# Patient Record
Sex: FEMALE | Race: WHITE | URBAN - METROPOLITAN AREA
[De-identification: names, ages, dates, MRNs, and addresses within clinical notes are randomized per-mention and may not be internally consistent; named-entity substitution may affect disease eponyms.]

---

## 2017-06-07 LAB
HCT VFR BLD CALC: 33.7 % (ref 36–48)
HEMOGLOBIN: 10.9 G/DL (ref 12–16)
MCH RBC QN AUTO: 29.7 PG (ref 26–34)
MCHC RBC AUTO-ENTMCNC: 32.5 G/DL (ref 31–36)
MCV RBC AUTO: 91.5 FL (ref 80–100)
PDW BLD-RTO: 13.7 % (ref 12.4–15.4)
PLATELET # BLD: 210 K/UL (ref 135–450)
PMV BLD AUTO: 9.9 FL (ref 5–10.5)
RBC # BLD: 3.68 M/UL (ref 4–5.2)
WBC # BLD: 11.3 K/UL (ref 4–11)

## 2017-06-08 LAB — GLUCOSE CHALLENGE: 91 MG/DL

## 2017-09-25 PROBLEM — Z37.9 NORMAL LABOR: Status: ACTIVE | Noted: 2017-09-25

## 2018-04-30 LAB
ABO/RH: NORMAL
ANTIBODY SCREEN: NORMAL
HEPATITIS C ANTIBODY INTERPRETATION: NORMAL

## 2018-05-01 LAB
BASOPHILS ABSOLUTE: 0 K/UL (ref 0–0.2)
BASOPHILS RELATIVE PERCENT: 0.3 %
EOSINOPHILS ABSOLUTE: 0.3 K/UL (ref 0–0.6)
EOSINOPHILS RELATIVE PERCENT: 3.2 %
HCT VFR BLD CALC: 36.6 % (ref 36–48)
HEMOGLOBIN: 12.2 G/DL (ref 12–16)
HEPATITIS B SURFACE ANTIGEN INTERPRETATION: ABNORMAL
LYMPHOCYTES ABSOLUTE: 1.6 K/UL (ref 1–5.1)
LYMPHOCYTES RELATIVE PERCENT: 20.2 %
MCH RBC QN AUTO: 27.6 PG (ref 26–34)
MCHC RBC AUTO-ENTMCNC: 33.4 G/DL (ref 31–36)
MCV RBC AUTO: 82.7 FL (ref 80–100)
MONOCYTES ABSOLUTE: 0.5 K/UL (ref 0–1.3)
MONOCYTES RELATIVE PERCENT: 6.4 %
NEUTROPHILS ABSOLUTE: 5.5 K/UL (ref 1.7–7.7)
NEUTROPHILS RELATIVE PERCENT: 69.9 %
PDW BLD-RTO: 16.4 % (ref 12.4–15.4)
PLATELET # BLD: 180 K/UL (ref 135–450)
PMV BLD AUTO: 10.5 FL (ref 5–10.5)
RBC # BLD: 4.43 M/UL (ref 4–5.2)
RPR: ABNORMAL
RUBELLA ANTIBODY IGG: 147.2 IU/ML
WBC # BLD: 7.9 K/UL (ref 4–11)

## 2018-05-02 LAB
HIV AG/AB: NORMAL
HIV ANTIGEN: NORMAL
HIV-1 ANTIBODY: NORMAL
HIV-2 AB: NORMAL

## 2018-06-29 LAB
GLUCOSE CHALLENGE: 123 MG/DL
HCT VFR BLD CALC: 31.6 % (ref 36–48)
HEMOGLOBIN: 10.7 G/DL (ref 12–16)
MCH RBC QN AUTO: 29.2 PG (ref 26–34)
MCHC RBC AUTO-ENTMCNC: 34 G/DL (ref 31–36)
MCV RBC AUTO: 85.8 FL (ref 80–100)
PDW BLD-RTO: 14.6 % (ref 12.4–15.4)
PLATELET # BLD: 187 K/UL (ref 135–450)
PMV BLD AUTO: 10.2 FL (ref 5–10.5)
RBC # BLD: 3.68 M/UL (ref 4–5.2)
WBC # BLD: 11.3 K/UL (ref 4–11)

## 2018-09-17 LAB
GROUP B STREP CULTURE: NEGATIVE
GROUP B STREP CULTURE: NEGATIVE

## 2018-10-07 ENCOUNTER — HOSPITAL ENCOUNTER (INPATIENT)
Age: 23
LOS: 2 days | Discharge: HOME | DRG: 560 | End: 2018-10-09
Attending: OBSTETRICS & GYNECOLOGY | Admitting: OBSTETRICS & GYNECOLOGY
Payer: COMMERCIAL

## 2018-10-07 ENCOUNTER — ANESTHESIA (OUTPATIENT)
Dept: LABOR AND DELIVERY | Age: 23
DRG: 560 | End: 2018-10-07
Payer: COMMERCIAL

## 2018-10-07 ENCOUNTER — ANESTHESIA EVENT (OUTPATIENT)
Dept: LABOR AND DELIVERY | Age: 23
DRG: 560 | End: 2018-10-07
Payer: COMMERCIAL

## 2018-10-07 LAB
AMPHETAMINE SCREEN, URINE: NORMAL
BARBITURATE SCREEN URINE: NORMAL
BENZODIAZEPINE SCREEN, URINE: NORMAL
BUPRENORPHINE URINE: NORMAL
CANNABINOID SCREEN URINE: NORMAL
COCAINE METABOLITE SCREEN URINE: NORMAL
HCT VFR BLD CALC: 30.7 % (ref 36–48)
HEMOGLOBIN: 10.4 G/DL (ref 12–16)
Lab: NORMAL
MCH RBC QN AUTO: 26.4 PG (ref 26–34)
MCHC RBC AUTO-ENTMCNC: 34 G/DL (ref 31–36)
MCV RBC AUTO: 77.5 FL (ref 80–100)
METHADONE SCREEN, URINE: NORMAL
OPIATE SCREEN URINE: NORMAL
OXYCODONE URINE: NORMAL
PDW BLD-RTO: 14.8 % (ref 12.4–15.4)
PH UA: 5
PHENCYCLIDINE SCREEN URINE: NORMAL
PLATELET # BLD: 185 K/UL (ref 135–450)
PMV BLD AUTO: 9.3 FL (ref 5–10.5)
PROPOXYPHENE SCREEN: NORMAL
RBC # BLD: 3.96 M/UL (ref 4–5.2)
SPECIMEN STATUS: NORMAL
WBC # BLD: 8.4 K/UL (ref 4–11)

## 2018-10-07 PROCEDURE — 86780 TREPONEMA PALLIDUM: CPT

## 2018-10-07 PROCEDURE — 6370000000 HC RX 637 (ALT 250 FOR IP): Performed by: OBSTETRICS & GYNECOLOGY

## 2018-10-07 PROCEDURE — 1220000000 HC SEMI PRIVATE OB R&B

## 2018-10-07 PROCEDURE — 0HQ9XZZ REPAIR PERINEUM SKIN, EXTERNAL APPROACH: ICD-10-PCS | Performed by: OBSTETRICS & GYNECOLOGY

## 2018-10-07 PROCEDURE — 6360000002 HC RX W HCPCS: Performed by: OBSTETRICS & GYNECOLOGY

## 2018-10-07 PROCEDURE — 3E033VJ INTRODUCTION OF OTHER HORMONE INTO PERIPHERAL VEIN, PERCUTANEOUS APPROACH: ICD-10-PCS | Performed by: OBSTETRICS & GYNECOLOGY

## 2018-10-07 PROCEDURE — 3700000025 ANESTHESIA EPIDURAL BLOCK: Performed by: ANESTHESIOLOGY

## 2018-10-07 PROCEDURE — 10907ZC DRAINAGE OF AMNIOTIC FLUID, THERAPEUTIC FROM PRODUCTS OF CONCEPTION, VIA NATURAL OR ARTIFICIAL OPENING: ICD-10-PCS | Performed by: OBSTETRICS & GYNECOLOGY

## 2018-10-07 PROCEDURE — 2580000003 HC RX 258: Performed by: OBSTETRICS & GYNECOLOGY

## 2018-10-07 PROCEDURE — 7200000001 HC VAGINAL DELIVERY

## 2018-10-07 PROCEDURE — 2500000003 HC RX 250 WO HCPCS: Performed by: NURSE ANESTHETIST, CERTIFIED REGISTERED

## 2018-10-07 PROCEDURE — 80307 DRUG TEST PRSMV CHEM ANLYZR: CPT

## 2018-10-07 PROCEDURE — 51701 INSERT BLADDER CATHETER: CPT

## 2018-10-07 PROCEDURE — 85027 COMPLETE CBC AUTOMATED: CPT

## 2018-10-07 RX ORDER — LANOLIN 100 %
OINTMENT (GRAM) TOPICAL PRN
Status: DISCONTINUED | OUTPATIENT
Start: 2018-10-07 | End: 2018-10-09 | Stop reason: HOSPADM

## 2018-10-07 RX ORDER — ACETAMINOPHEN 325 MG/1
650 TABLET ORAL EVERY 4 HOURS PRN
Status: DISCONTINUED | OUTPATIENT
Start: 2018-10-07 | End: 2018-10-09 | Stop reason: HOSPADM

## 2018-10-07 RX ORDER — IBUPROFEN 600 MG/1
600 TABLET ORAL EVERY 6 HOURS SCHEDULED
Status: DISCONTINUED | OUTPATIENT
Start: 2018-10-07 | End: 2018-10-07

## 2018-10-07 RX ORDER — DOCUSATE SODIUM 100 MG/1
100 CAPSULE, LIQUID FILLED ORAL 2 TIMES DAILY
Status: DISCONTINUED | OUTPATIENT
Start: 2018-10-07 | End: 2018-10-07

## 2018-10-07 RX ORDER — SODIUM CHLORIDE 0.9 % (FLUSH) 0.9 %
10 SYRINGE (ML) INJECTION EVERY 12 HOURS SCHEDULED
Status: DISCONTINUED | OUTPATIENT
Start: 2018-10-07 | End: 2018-10-07

## 2018-10-07 RX ORDER — IBUPROFEN 800 MG/1
800 TABLET ORAL EVERY 6 HOURS PRN
Qty: 40 TABLET | Refills: 0 | Status: SHIPPED | OUTPATIENT
Start: 2018-10-07

## 2018-10-07 RX ORDER — ONDANSETRON 2 MG/ML
4 INJECTION INTRAMUSCULAR; INTRAVENOUS EVERY 6 HOURS PRN
Status: DISCONTINUED | OUTPATIENT
Start: 2018-10-07 | End: 2018-10-09 | Stop reason: HOSPADM

## 2018-10-07 RX ORDER — SODIUM CHLORIDE 0.9 % (FLUSH) 0.9 %
10 SYRINGE (ML) INJECTION PRN
Status: DISCONTINUED | OUTPATIENT
Start: 2018-10-07 | End: 2018-10-07

## 2018-10-07 RX ORDER — IBUPROFEN 800 MG/1
800 TABLET ORAL EVERY 6 HOURS SCHEDULED
Status: DISCONTINUED | OUTPATIENT
Start: 2018-10-08 | End: 2018-10-08

## 2018-10-07 RX ORDER — DOCUSATE SODIUM 100 MG/1
100 CAPSULE, LIQUID FILLED ORAL 2 TIMES DAILY
Status: DISCONTINUED | OUTPATIENT
Start: 2018-10-07 | End: 2018-10-09 | Stop reason: HOSPADM

## 2018-10-07 RX ORDER — FAMOTIDINE 20 MG/1
20 TABLET, FILM COATED ORAL 2 TIMES DAILY PRN
Status: DISCONTINUED | OUTPATIENT
Start: 2018-10-07 | End: 2018-10-09 | Stop reason: HOSPADM

## 2018-10-07 RX ORDER — LIDOCAINE HYDROCHLORIDE 20 MG/ML
INJECTION, SOLUTION INFILTRATION; PERINEURAL PRN
Status: DISCONTINUED | OUTPATIENT
Start: 2018-10-07 | End: 2018-10-07 | Stop reason: SDUPTHER

## 2018-10-07 RX ORDER — SODIUM CHLORIDE 0.9 % (FLUSH) 0.9 %
10 SYRINGE (ML) INJECTION PRN
Status: DISCONTINUED | OUTPATIENT
Start: 2018-10-07 | End: 2018-10-09 | Stop reason: HOSPADM

## 2018-10-07 RX ORDER — SODIUM CHLORIDE 0.9 % (FLUSH) 0.9 %
10 SYRINGE (ML) INJECTION EVERY 12 HOURS SCHEDULED
Status: DISCONTINUED | OUTPATIENT
Start: 2018-10-07 | End: 2018-10-09 | Stop reason: HOSPADM

## 2018-10-07 RX ORDER — LIDOCAINE HYDROCHLORIDE AND EPINEPHRINE BITARTRATE 20; .01 MG/ML; MG/ML
INJECTION, SOLUTION SUBCUTANEOUS PRN
Status: DISCONTINUED | OUTPATIENT
Start: 2018-10-07 | End: 2018-10-07 | Stop reason: SDUPTHER

## 2018-10-07 RX ORDER — METHYLERGONOVINE MALEATE 0.2 MG/ML
200 INJECTION INTRAVENOUS
Status: ACTIVE | OUTPATIENT
Start: 2018-10-07 | End: 2018-10-07

## 2018-10-07 RX ORDER — OXYCODONE HYDROCHLORIDE 5 MG/1
5 TABLET ORAL EVERY 6 HOURS PRN
Qty: 12 TABLET | Refills: 0 | Status: SHIPPED | OUTPATIENT
Start: 2018-10-07 | End: 2018-10-10

## 2018-10-07 RX ORDER — ONDANSETRON 2 MG/ML
4 INJECTION INTRAMUSCULAR; INTRAVENOUS EVERY 6 HOURS PRN
Status: DISCONTINUED | OUTPATIENT
Start: 2018-10-07 | End: 2018-10-07

## 2018-10-07 RX ORDER — OXYCODONE HYDROCHLORIDE AND ACETAMINOPHEN 5; 325 MG/1; MG/1
2 TABLET ORAL EVERY 4 HOURS PRN
Status: DISCONTINUED | OUTPATIENT
Start: 2018-10-07 | End: 2018-10-09 | Stop reason: HOSPADM

## 2018-10-07 RX ORDER — ACETAMINOPHEN 500 MG
1000 TABLET ORAL EVERY 6 HOURS PRN
Qty: 30 TABLET | Refills: 0 | Status: SHIPPED | OUTPATIENT
Start: 2018-10-07 | End: 2022-04-19

## 2018-10-07 RX ORDER — SODIUM CHLORIDE, SODIUM LACTATE, POTASSIUM CHLORIDE, CALCIUM CHLORIDE 600; 310; 30; 20 MG/100ML; MG/100ML; MG/100ML; MG/100ML
INJECTION, SOLUTION INTRAVENOUS CONTINUOUS
Status: DISCONTINUED | OUTPATIENT
Start: 2018-10-07 | End: 2018-10-07

## 2018-10-07 RX ORDER — FERROUS SULFATE 325(65) MG
325 TABLET ORAL DAILY
Status: DISCONTINUED | OUTPATIENT
Start: 2018-10-07 | End: 2018-10-09 | Stop reason: HOSPADM

## 2018-10-07 RX ORDER — CARBOPROST TROMETHAMINE 250 UG/ML
250 INJECTION, SOLUTION INTRAMUSCULAR
Status: ACTIVE | OUTPATIENT
Start: 2018-10-07 | End: 2018-10-07

## 2018-10-07 RX ORDER — OXYCODONE HYDROCHLORIDE AND ACETAMINOPHEN 5; 325 MG/1; MG/1
1 TABLET ORAL EVERY 4 HOURS PRN
Status: DISCONTINUED | OUTPATIENT
Start: 2018-10-07 | End: 2018-10-09 | Stop reason: HOSPADM

## 2018-10-07 RX ORDER — ONDANSETRON 4 MG/1
4 TABLET, ORALLY DISINTEGRATING ORAL EVERY 6 HOURS PRN
Status: DISCONTINUED | OUTPATIENT
Start: 2018-10-07 | End: 2018-10-09 | Stop reason: HOSPADM

## 2018-10-07 RX ORDER — SENNA AND DOCUSATE SODIUM 50; 8.6 MG/1; MG/1
1 TABLET, FILM COATED ORAL DAILY PRN
Status: DISCONTINUED | OUTPATIENT
Start: 2018-10-07 | End: 2018-10-09 | Stop reason: HOSPADM

## 2018-10-07 RX ORDER — LIDOCAINE HYDROCHLORIDE 10 MG/ML
INJECTION, SOLUTION INFILTRATION; PERINEURAL PRN
Status: DISCONTINUED | OUTPATIENT
Start: 2018-10-07 | End: 2018-10-07 | Stop reason: SDUPTHER

## 2018-10-07 RX ORDER — SIMETHICONE 80 MG
80 TABLET,CHEWABLE ORAL EVERY 6 HOURS PRN
Status: DISCONTINUED | OUTPATIENT
Start: 2018-10-07 | End: 2018-10-09 | Stop reason: HOSPADM

## 2018-10-07 RX ORDER — MISOPROSTOL 100 UG/1
800 TABLET ORAL PRN
Status: DISCONTINUED | OUTPATIENT
Start: 2018-10-07 | End: 2018-10-09 | Stop reason: HOSPADM

## 2018-10-07 RX ADMIN — Medication 9 MILLI-UNITS/MIN: at 14:42

## 2018-10-07 RX ADMIN — LIDOCAINE HYDROCHLORIDE 10 ML: 20 INJECTION, SOLUTION INFILTRATION; PERINEURAL at 17:49

## 2018-10-07 RX ADMIN — Medication 1 MILLI-UNITS/MIN: at 11:24

## 2018-10-07 RX ADMIN — Medication 12 ML/HR: at 14:58

## 2018-10-07 RX ADMIN — DOCUSATE SODIUM 100 MG: 100 CAPSULE, LIQUID FILLED ORAL at 22:52

## 2018-10-07 RX ADMIN — SODIUM CHLORIDE, POTASSIUM CHLORIDE, SODIUM LACTATE AND CALCIUM CHLORIDE: 600; 310; 30; 20 INJECTION, SOLUTION INTRAVENOUS at 12:42

## 2018-10-07 RX ADMIN — LIDOCAINE HYDROCHLORIDE 5 ML: 10 INJECTION, SOLUTION INFILTRATION; PERINEURAL at 14:52

## 2018-10-07 RX ADMIN — SODIUM CHLORIDE, POTASSIUM CHLORIDE, SODIUM LACTATE AND CALCIUM CHLORIDE: 600; 310; 30; 20 INJECTION, SOLUTION INTRAVENOUS at 10:20

## 2018-10-07 RX ADMIN — LIDOCAINE HYDROCHLORIDE AND EPINEPHRINE 5 ML: 20; 10 INJECTION, SOLUTION INFILTRATION; PERINEURAL at 14:55

## 2018-10-07 RX ADMIN — SODIUM CHLORIDE, POTASSIUM CHLORIDE, SODIUM LACTATE AND CALCIUM CHLORIDE: 600; 310; 30; 20 INJECTION, SOLUTION INTRAVENOUS at 18:14

## 2018-10-07 RX ADMIN — IBUPROFEN 800 MG: 800 TABLET, FILM COATED ORAL at 22:52

## 2018-10-07 ASSESSMENT — LIFESTYLE VARIABLES: SMOKING_STATUS: 0

## 2018-10-07 ASSESSMENT — PAIN SCALES - GENERAL: PAINLEVEL_OUTOF10: 3

## 2018-10-07 NOTE — ANESTHESIA PRE PROCEDURE
Endo/Other ROS                    Abdominal:           Vascular: negative vascular ROS. Anesthesia Plan      epidural     ASA 2     (Lab Results       Component                Value               Date                       WBC                      8.4                 10/07/2018                 HGB                      10.4 (L)            10/07/2018                 HCT                      30.7 (L)            10/07/2018                 MCV                      77.5 (L)            10/07/2018                 PLT                      185                 10/07/2018            Height: 5' 7\" (1.702 m), Weight: 192 lb (87.1 kg), BP: 114/59  Patient request epidural for labor and delivery. Discussed procedure and risks including but not limited to changes in VSS, allergic reaction, infection, intravascular injection, paresthesia, n/v, severe headache, temporary or permanent rare neurologic sequelae and failed block. All questions answered. Patient agreed to proceed  Lab Results       Component                Value               Date                       WBC                      8.4                 10/07/2018                 HGB                      10.4 (L)            10/07/2018                 HCT                      30.7 (L)            10/07/2018                 MCV                      77.5 (L)            10/07/2018                 PLT                      185                 10/07/2018            Height: 5' 7\" (1.702 m), Weight: 192 lb (87.1 kg)  )        Anesthetic plan and risks discussed with patient. Use of blood products discussed with patient whom consented to blood products. Plan discussed with CRNA and attending.     Attending anesthesiologist reviewed and agrees with [address] [place], [place] [address]MANISHA - CRNA   10/7/2018

## 2018-10-07 NOTE — ANESTHESIA PROCEDURE NOTES
paresthesia. Spinal needle removed. Threaded epidural catheter through Tuohy needle easily. Tuohy needle withdrawn. Test Dose:  1445         Negative aspiration. 3cc of 1.5% Lido with epi 1:200,000 test dose given. Negative test dose. Skin:  11cm catheter taped at the skin. Secured with steri strips, tegaderm, and tape. Infusion:  .15% Ropivacaine with Fentanyl (2ug/cc)  Auto bolus 4 ml every 20 minutes. (Max. Dose- 40 ml/hr.)          VAS: start 3/10, end 0/10    Patient in supine position with left uterine displacement.  VSS

## 2018-10-07 NOTE — PLAN OF CARE
Problem: Anxiety:  Goal: Level of anxiety will decrease  Level of anxiety will decrease   Outcome: Met This Shift      Problem: Breathing Pattern - Ineffective:  Goal: Able to breathe comfortably  Able to breathe comfortably   Outcome: Met This Shift      Problem: Fluid Volume - Imbalance:  Goal: Absence of imbalanced fluid volume signs and symptoms  Absence of imbalanced fluid volume signs and symptoms   Outcome: Met This Shift    Goal: Absence of intrapartum hemorrhage signs and symptoms  Absence of intrapartum hemorrhage signs and symptoms   Outcome: Ongoing      Problem: Labor Process - Prolonged:  Goal: Labor progression, first stage, within specified pattern  Labor progression, first stage, within specified pattern   Outcome: Met This Shift    Goal: Uterine contractions within specified parameters  Uterine contractions within specified parameters   Outcome: Met This Shift      Problem: Pain - Acute:  Goal: Able to cope with pain  Able to cope with pain   Outcome: Met This Shift      Problem: Urinary Retention:  Goal: Experiences of bladder distention will decrease  Experiences of bladder distention will decrease   Outcome: Met This Shift    Goal: Urinary elimination within specified parameters  Urinary elimination within specified parameters   Outcome: Met This Shift

## 2018-10-07 NOTE — ANESTHESIA POSTPROCEDURE EVALUATION
Department of Anesthesiology  Postprocedure Note    Patient: Brandi Maier  MRN: [id number]  YOB: 1995  Date of evaluation: 10/7/2018  Time:  7:24 PM     Procedure Summary     Date:  10/07/18 Room / Location:      Anesthesia Start:  1440 Anesthesia Stop:  1838    Procedure:  ANESTHESIA LABOR ANALGESIA Diagnosis:      Scheduled Providers:   Responsible Provider:  Roque Reed MD    Anesthesia Type:  epidural ASA Status:  2          Anesthesia Type: epidural    Ale Phase I: Ale Score: 8    Ale Phase II:      Last vitals: Reviewed and per EMR flowsheets.        Anesthesia Post Evaluation    Patient location during evaluation: floor  Patient participation: complete - patient participated  Complications: no  Hydration status: stable

## 2018-10-07 NOTE — FLOWSHEET NOTE
Patient admitted to LD room 10. Room orientation provided and admission process performed, patient questions addressed. Lab work drawn per protocol. Dr. David Johnson  Notified of patient's arrival and exam status as well as orders received from patient's office provider. No change in the plan of care.  Induction process explained to patient patient verbalized understanding

## 2018-10-08 LAB — TOTAL SYPHILLIS IGG/IGM: NORMAL

## 2018-10-08 PROCEDURE — 6360000002 HC RX W HCPCS: Performed by: OBSTETRICS & GYNECOLOGY

## 2018-10-08 PROCEDURE — 90686 IIV4 VACC NO PRSV 0.5 ML IM: CPT | Performed by: OBSTETRICS & GYNECOLOGY

## 2018-10-08 PROCEDURE — 6370000000 HC RX 637 (ALT 250 FOR IP): Performed by: OBSTETRICS & GYNECOLOGY

## 2018-10-08 PROCEDURE — 1220000000 HC SEMI PRIVATE OB R&B

## 2018-10-08 PROCEDURE — G0008 ADMIN INFLUENZA VIRUS VAC: HCPCS | Performed by: OBSTETRICS & GYNECOLOGY

## 2018-10-08 RX ORDER — IBUPROFEN 800 MG/1
800 TABLET ORAL
Status: DISCONTINUED | OUTPATIENT
Start: 2018-10-08 | End: 2018-10-09 | Stop reason: HOSPADM

## 2018-10-08 RX ADMIN — ACETAMINOPHEN 650 MG: 325 TABLET, FILM COATED ORAL at 08:18

## 2018-10-08 RX ADMIN — BENZOCAINE AND LEVOMENTHOL: 200; 5 SPRAY TOPICAL at 00:09

## 2018-10-08 RX ADMIN — OXYCODONE HYDROCHLORIDE AND ACETAMINOPHEN 1 TABLET: 5; 325 TABLET ORAL at 13:23

## 2018-10-08 RX ADMIN — DOCUSATE SODIUM 100 MG: 100 CAPSULE, LIQUID FILLED ORAL at 20:18

## 2018-10-08 RX ADMIN — OXYCODONE HYDROCHLORIDE AND ACETAMINOPHEN 1 TABLET: 5; 325 TABLET ORAL at 18:01

## 2018-10-08 RX ADMIN — DOCUSATE SODIUM 100 MG: 100 CAPSULE, LIQUID FILLED ORAL at 08:18

## 2018-10-08 RX ADMIN — OXYCODONE HYDROCHLORIDE AND ACETAMINOPHEN 1 TABLET: 5; 325 TABLET ORAL at 22:38

## 2018-10-08 RX ADMIN — INFLUENZA A VIRUS A/MICHIGAN/45/2015 X-275 (H1N1) ANTIGEN (FORMALDEHYDE INACTIVATED), INFLUENZA A VIRUS A/SINGAPORE/INFIMH-16-0019/2016 IVR-186 (H3N2) ANTIGEN (FORMALDEHYDE INACTIVATED), INFLUENZA B VIRUS B/PHUKET/3073/2013 ANTIGEN (FORMALDEHYDE INACTIVATED), AND INFLUENZA B VIRUS B/MARYLAND/15/2016 BX-69A ANTIGEN (FORMALDEHYDE INACTIVATED) 0.5 ML: 15; 15; 15; 15 INJECTION, SUSPENSION INTRAMUSCULAR at 18:14

## 2018-10-08 RX ADMIN — HYDROCORTISONE: 25 CREAM TOPICAL at 18:01

## 2018-10-08 ASSESSMENT — PAIN SCALES - GENERAL
PAINLEVEL_OUTOF10: 5
PAINLEVEL_OUTOF10: 4
PAINLEVEL_OUTOF10: 4
PAINLEVEL_OUTOF10: 3

## 2018-10-08 NOTE — PROGRESS NOTES
Emperatriz Hightower MD    famotidine (PEPCID) tablet 20 mg, 20 mg, Oral, BID PRN, Deisi Cadena MD    ferrous sulfate tablet 325 mg, 325 mg, Oral, Daily, Deisi Cadena MD    measles, mumps and rubella vaccine (MMR) injection 0.5 mL, 0.5 mL, Subcutaneous, Prior to discharge, Deisi Cadena MD    Tetanus-Diphth-Acell Pertussis (BOOSTRIX) injection 0.5 mL, 0.5 mL, Intramuscular, Prior to discharge, Deisi Cadena MD    lanolin ointment, , Topical, PRN, Deisi Cadena MD    witch hazel-glycerin (TUCKS) pad, , Topical, PRN, Deisi Cadena MD    hydrocortisone 2.5 % cream, , Topical, Q2H PRN, Deisi Cadena MD    benzocaine-menthol (DERMOPLAST) 20-0.5 % spray, , Topical, PRN, Deisi Cadena MD    oxytocin (PITOCIN) 30 units in 500 mL infusion, 1 paddy-units/min, Intravenous, Continuous, Deisi Cadena MD    misoprostol (CYTOTEC) tablet 800 mcg, 800 mcg, Rectal, PRN, Deisi Cadena MD    benzocaine-menthol (DERMOPLAST) 20-0.5 % spray, , Topical, PRN, Deisi Cadena MD     Assessment/Plan:      Post Partum: Continue Postpartum care  D/c home tomorrow

## 2018-10-09 VITALS
TEMPERATURE: 98 F | WEIGHT: 192 LBS | SYSTOLIC BLOOD PRESSURE: 106 MMHG | HEART RATE: 79 BPM | OXYGEN SATURATION: 100 % | BODY MASS INDEX: 30.13 KG/M2 | DIASTOLIC BLOOD PRESSURE: 69 MMHG | RESPIRATION RATE: 16 BRPM | HEIGHT: 67 IN

## 2018-10-09 PROCEDURE — 6370000000 HC RX 637 (ALT 250 FOR IP): Performed by: OBSTETRICS & GYNECOLOGY

## 2018-10-09 RX ADMIN — DOCUSATE SODIUM 100 MG: 100 CAPSULE, LIQUID FILLED ORAL at 08:28

## 2018-10-09 RX ADMIN — IBUPROFEN 800 MG: 800 TABLET, FILM COATED ORAL at 08:30

## 2018-10-09 ASSESSMENT — PAIN SCALES - GENERAL
PAINLEVEL_OUTOF10: 3
PAINLEVEL_OUTOF10: 3

## 2018-10-09 NOTE — PROGRESS NOTES
[facility] post-partum note    Post-partum Day #2    Subjective:    Doing well  Lochia: Normal    Objective:  Vitals:    10/08/18 0850 10/08/18 1648 10/09/18 0030 10/09/18 0828   BP: 103/60 (!) 101/53 107/63 106/69   Pulse: 81 63 69 79   Resp: 18 18 16 16   Temp: 97.7 °F (36.5 °C) 98 °F (36.7 °C) 98.2 °F (36.8 °C) 98 °F (36.7 °C)   TempSrc: Oral Oral Oral Oral   SpO2:       Weight:       Height:           Physical Examination:  Appears well  Uterus: Firm, NT  Calves: NT    Lab Results   Component Value Date    WBC 8.4 10/07/2018    HGB 10.4 (L) 10/07/2018    HCT 30.7 (L) 10/07/2018    MCV 77.5 (L) 10/07/2018     10/07/2018          Current Facility-Administered Medications:     ibuprofen (ADVIL;MOTRIN) tablet 800 mg, 800 mg, Oral, TID WC, Madhav Fletcher MD    sodium chloride flush 0.9 % injection 10 mL, 10 mL, Intravenous, 2 times per day, Madhav Fletcher MD    sodium chloride flush 0.9 % injection 10 mL, 10 mL, Intravenous, PRN, Madhav Fletcher MD    rho(D) immune globulin (HYPERRHO S/D) injection 300 mcg, 300 mcg, Intramuscular, Once, Madhav Fletcher MD    acetaminophen (TYLENOL) tablet 650 mg, 650 mg, Oral, Q4H PRN, Madhav Fletcher MD, 650 mg at 10/08/18 0818    oxyCODONE-acetaminophen (PERCOCET) 5-325 MG per tablet 1 tablet, 1 tablet, Oral, Q4H PRN, 1 tablet at 10/08/18 2238 **OR** oxyCODONE-acetaminophen (PERCOCET) 5-325 MG per tablet 2 tablet, 2 tablet, Oral, Q4H PRN, Madhav Fletcher MD    [facility]) chewable tablet 80 mg, 80 mg, Oral, Q6H PRN, Madhav Fletcher MD    docusate sodium (COLACE) capsule 100 mg, 100 mg, Oral, BID, Madhav MD Chance, 100 mg at 10/09/18 3277    magnesium hydroxide (MILK OF MAGNESIA) 400 MG/5ML suspension 30 mL, 30 mL, Oral, Daily PRN, Madhavcarmen Fletcher MD    sennosides-docusate sodium (SENOKOT-S) 8.6-50 MG tablet 1 tablet, 1 tablet, Oral, Daily PRN, Madhavmelly Fletcher MD    ondansetron [facility]) injection 4 mg, 4 mg, Intravenous, Q6H PRN, Betty Osborne Emperatriz Hightower MD    ondansetron (ZOFRAN-ODT) disintegrating tablet 4 mg, 4 mg, Oral, Q6H PRN, Deisi Cadena MD    famotidine (PEPCID) tablet 20 mg, 20 mg, Oral, BID PRN, Deisi Cadena MD    ferrous sulfate tablet 325 mg, 325 mg, Oral, Daily, Deisi Cadena MD    measles, mumps and rubella vaccine (MMR) injection 0.5 mL, 0.5 mL, Subcutaneous, Prior to discharge, Deisi Cadena MD    Tetanus-Diphth-Acell Pertussis (BOOSTRIX) injection 0.5 mL, 0.5 mL, Intramuscular, Prior to discharge, Deisi Cadena MD    lanolin ointment, , Topical, PRN, Deisi Cadena MD    witch hazel-glycerin (TUCKS) pad, , Topical, PRN, Deisi Cadena MD    hydrocortisone 2.5 % cream, , Topical, Q2H PRN, Deisi Cadena MD    benzocaine-menthol (DERMOPLAST) 20-0.5 % spray, , Topical, PRN, Deisi Cadena MD    oxytocin (PITOCIN) 30 units in 500 mL infusion, 1 paddy-units/min, Intravenous, Continuous, Deisi Cadena MD    misoprostol (CYTOTEC) tablet 800 mcg, 800 mcg, Rectal, PRN, Deisi Cadena MD    benzocaine-menthol (DERMOPLAST) 20-0.5 % spray, , Topical, PRN, Deisi Cadena MD     Assessment/Plan:      Post Partum: Continue Postpartum care  home

## 2019-11-21 LAB
CANDIDA SPECIES, DNA PROBE: ABNORMAL
GARDNERELLA VAGINALIS, DNA PROBE: ABNORMAL
TRICHOMONAS VAGINALIS DNA: ABNORMAL

## 2021-04-06 LAB
HEPATITIS B SURFACE ANTIGEN INTERPRETATION: NORMAL
HEPATITIS C ANTIBODY INTERPRETATION: NORMAL

## 2021-04-07 LAB
HIV AG/AB: NORMAL
HIV ANTIGEN: NORMAL
HIV-1 ANTIBODY: NORMAL
HIV-2 AB: NORMAL
TOTAL SYPHILLIS IGG/IGM: NORMAL

## 2022-08-15 ENCOUNTER — HOSPITAL ENCOUNTER (EMERGENCY)
Age: 27
Discharge: LEFT BEFORE DISCHARGE | End: 2022-08-15
Payer: COMMERCIAL

## 2022-08-15 PROCEDURE — 93005 ELECTROCARDIOGRAM TRACING: CPT | Performed by: EMERGENCY MEDICINE

## 2022-08-16 LAB
EKG ATRIAL RATE: 69 BPM
EKG DIAGNOSIS: NORMAL
EKG P AXIS: 23 DEGREES
EKG P-R INTERVAL: 142 MS
EKG Q-T INTERVAL: 394 MS
EKG QRS DURATION: 92 MS
EKG QTC CALCULATION (BAZETT): 422 MS
EKG R AXIS: 49 DEGREES
EKG T AXIS: 41 DEGREES
EKG VENTRICULAR RATE: 69 BPM

## 2022-08-16 PROCEDURE — 93010 ELECTROCARDIOGRAM REPORT: CPT | Performed by: INTERNAL MEDICINE

## 2024-09-13 ENCOUNTER — APPOINTMENT (OUTPATIENT)
Dept: ULTRASOUND IMAGING | Age: 29
End: 2024-09-13
Payer: COMMERCIAL

## 2024-09-13 ENCOUNTER — HOSPITAL ENCOUNTER (EMERGENCY)
Age: 29
Discharge: HOME | End: 2024-09-13
Attending: EMERGENCY MEDICINE
Payer: COMMERCIAL

## 2024-09-13 VITALS
HEART RATE: 70 BPM | WEIGHT: 194.6 LBS | OXYGEN SATURATION: 99 % | BODY MASS INDEX: 45.04 KG/M2 | DIASTOLIC BLOOD PRESSURE: 77 MMHG | RESPIRATION RATE: 20 BRPM | SYSTOLIC BLOOD PRESSURE: 120 MMHG | HEIGHT: 55 IN | TEMPERATURE: 98 F

## 2024-09-13 DIAGNOSIS — O20.0 THREATENED MISCARRIAGE IN EARLY PREGNANCY: Primary | ICD-10-CM

## 2024-09-13 DIAGNOSIS — O46.8X1 SUBCHORIONIC HEMATOMA IN FIRST TRIMESTER, SINGLE OR UNSPECIFIED FETUS: ICD-10-CM

## 2024-09-13 DIAGNOSIS — O41.8X10 SUBCHORIONIC HEMATOMA IN FIRST TRIMESTER, SINGLE OR UNSPECIFIED FETUS: ICD-10-CM

## 2024-09-13 LAB
ANION GAP SERPL CALCULATED.3IONS-SCNC: 12 MMOL/L (ref 3–16)
B-HCG SERPL EIA 3RD IS-ACNC: NORMAL MIU/ML
BACTERIA URNS QL MICRO: ABNORMAL /HPF
BASOPHILS # BLD: 0 K/UL (ref 0–0.2)
BASOPHILS NFR BLD: 0.5 %
BILIRUB UR QL STRIP.AUTO: NEGATIVE
BUN SERPL-MCNC: 10 MG/DL (ref 7–20)
CALCIUM SERPL-MCNC: 9 MG/DL (ref 8.3–10.6)
CHLORIDE SERPL-SCNC: 103 MMOL/L (ref 99–110)
CLARITY UR: CLEAR
CO2 SERPL-SCNC: 22 MMOL/L (ref 21–32)
COLOR UR: YELLOW
CREAT SERPL-MCNC: 0.7 MG/DL (ref 0.6–1.1)
DEPRECATED RDW RBC AUTO: 14.6 % (ref 12.4–15.4)
EOSINOPHIL # BLD: 0.2 K/UL (ref 0–0.6)
EOSINOPHIL NFR BLD: 2.6 %
EPI CELLS #/AREA URNS AUTO: 4 /HPF (ref 0–5)
GFR SERPLBLD CREATININE-BSD FMLA CKD-EPI: >90 ML/MIN/{1.73_M2}
GLUCOSE SERPL-MCNC: 96 MG/DL (ref 70–99)
GLUCOSE UR STRIP.AUTO-MCNC: NEGATIVE MG/DL
HCT VFR BLD AUTO: 38.4 % (ref 36–48)
HGB BLD-MCNC: 12.9 G/DL (ref 12–16)
HGB UR QL STRIP.AUTO: ABNORMAL
HYALINE CASTS #/AREA URNS AUTO: 0 /LPF (ref 0–8)
KETONES UR STRIP.AUTO-MCNC: NEGATIVE MG/DL
LEUKOCYTE ESTERASE UR QL STRIP.AUTO: ABNORMAL
LYMPHOCYTES # BLD: 0.8 K/UL (ref 1–5.1)
LYMPHOCYTES NFR BLD: 9.8 %
MCH RBC QN AUTO: 27.4 PG (ref 26–34)
MCHC RBC AUTO-ENTMCNC: 33.6 G/DL (ref 31–36)
MCV RBC AUTO: 81.6 FL (ref 80–100)
MONOCYTES # BLD: 0.5 K/UL (ref 0–1.3)
MONOCYTES NFR BLD: 6.5 %
NEUTROPHILS # BLD: 6.4 K/UL (ref 1.7–7.7)
NEUTROPHILS NFR BLD: 80.6 %
NITRITE UR QL STRIP.AUTO: NEGATIVE
PH UR STRIP.AUTO: 5.5 [PH] (ref 5–8)
PLATELET # BLD AUTO: 217 K/UL (ref 135–450)
PMV BLD AUTO: 9.9 FL (ref 5–10.5)
POTASSIUM SERPL-SCNC: 3.8 MMOL/L (ref 3.5–5.1)
PROT UR STRIP.AUTO-MCNC: NEGATIVE MG/DL
RBC # BLD AUTO: 4.71 M/UL (ref 4–5.2)
RBC CLUMPS #/AREA URNS AUTO: 15 /HPF (ref 0–4)
SODIUM SERPL-SCNC: 137 MMOL/L (ref 136–145)
SP GR UR STRIP.AUTO: 1.02 (ref 1–1.03)
UA COMPLETE W REFLEX CULTURE PNL UR: YES
UA DIPSTICK W REFLEX MICRO PNL UR: YES
URN SPEC COLLECT METH UR: ABNORMAL
UROBILINOGEN UR STRIP-ACNC: 1 E.U./DL
WBC # BLD AUTO: 7.9 K/UL (ref 4–11)
WBC #/AREA URNS AUTO: 21 /HPF (ref 0–5)

## 2024-09-13 PROCEDURE — 84702 CHORIONIC GONADOTROPIN TEST: CPT

## 2024-09-13 PROCEDURE — 80048 BASIC METABOLIC PNL TOTAL CA: CPT

## 2024-09-13 PROCEDURE — 85025 COMPLETE CBC W/AUTO DIFF WBC: CPT

## 2024-09-13 PROCEDURE — 81001 URINALYSIS AUTO W/SCOPE: CPT

## 2024-09-13 PROCEDURE — 76801 OB US < 14 WKS SINGLE FETUS: CPT

## 2024-09-13 PROCEDURE — 99284 EMERGENCY DEPT VISIT MOD MDM: CPT

## 2024-09-13 PROCEDURE — 87086 URINE CULTURE/COLONY COUNT: CPT

## 2024-09-13 ASSESSMENT — PAIN - FUNCTIONAL ASSESSMENT: PAIN_FUNCTIONAL_ASSESSMENT: NONE - DENIES PAIN

## 2024-09-14 LAB — BACTERIA UR CULT: NORMAL
